# Patient Record
Sex: FEMALE | Race: WHITE | NOT HISPANIC OR LATINO | Employment: PART TIME | URBAN - METROPOLITAN AREA
[De-identification: names, ages, dates, MRNs, and addresses within clinical notes are randomized per-mention and may not be internally consistent; named-entity substitution may affect disease eponyms.]

---

## 2018-07-24 PROBLEM — N31.9 NEUROGENIC BLADDER: Chronic | Status: ACTIVE | Noted: 2018-07-24

## 2018-07-24 PROBLEM — M25.562 CHRONIC ARTHRALGIAS OF KNEES AND HIPS: Chronic | Status: ACTIVE | Noted: 2018-07-24

## 2018-07-24 PROBLEM — E03.4 HYPOTHYROIDISM DUE TO ACQUIRED ATROPHY OF THYROID: Chronic | Status: ACTIVE | Noted: 2018-07-24

## 2018-07-24 PROBLEM — G89.29 CHRONIC ARTHRALGIAS OF KNEES AND HIPS: Chronic | Status: ACTIVE | Noted: 2018-07-24

## 2018-07-24 PROBLEM — M48.061 SPINAL STENOSIS OF LUMBAR REGION WITHOUT NEUROGENIC CLAUDICATION: Chronic | Status: ACTIVE | Noted: 2018-07-24

## 2018-07-24 PROBLEM — G35 MS (MULTIPLE SCLEROSIS): Chronic | Status: ACTIVE | Noted: 2018-07-24

## 2018-07-24 PROBLEM — M25.552 CHRONIC ARTHRALGIAS OF KNEES AND HIPS: Chronic | Status: ACTIVE | Noted: 2018-07-24

## 2018-07-24 PROBLEM — E55.9 VITAMIN D DEFICIENCY: Chronic | Status: ACTIVE | Noted: 2018-07-24

## 2018-07-24 PROBLEM — M25.561 CHRONIC ARTHRALGIAS OF KNEES AND HIPS: Chronic | Status: ACTIVE | Noted: 2018-07-24

## 2018-07-24 PROBLEM — M25.551 CHRONIC ARTHRALGIAS OF KNEES AND HIPS: Chronic | Status: ACTIVE | Noted: 2018-07-24

## 2020-04-02 PROBLEM — K76.0 FATTY LIVER DISEASE, NONALCOHOLIC: Chronic | Status: ACTIVE | Noted: 2020-04-02

## 2020-04-02 PROBLEM — K80.20 CALCULUS OF GALLBLADDER WITHOUT CHOLECYSTITIS WITHOUT OBSTRUCTION: Chronic | Status: ACTIVE | Noted: 2020-04-02

## 2021-02-11 PROBLEM — R60.0 PEDAL EDEMA: Status: ACTIVE | Noted: 2021-02-11

## 2021-07-27 PROBLEM — E66.811 CLASS 1 OBESITY DUE TO EXCESS CALORIES WITH SERIOUS COMORBIDITY AND BODY MASS INDEX (BMI) OF 33.0 TO 33.9 IN ADULT: Status: ACTIVE | Noted: 2021-07-27

## 2021-07-27 PROBLEM — E66.09 CLASS 1 OBESITY DUE TO EXCESS CALORIES WITH SERIOUS COMORBIDITY AND BODY MASS INDEX (BMI) OF 33.0 TO 33.9 IN ADULT: Status: ACTIVE | Noted: 2021-07-27

## 2021-10-15 ENCOUNTER — IMMUNIZATION (OUTPATIENT)
Dept: PRIMARY CARE CLINIC | Facility: CLINIC | Age: 61
End: 2021-10-15

## 2021-10-15 DIAGNOSIS — Z23 NEED FOR VACCINATION: Primary | ICD-10-CM

## 2021-10-15 PROCEDURE — 91300 COVID-19, MRNA, LNP-S, PF, 30 MCG/0.3 ML DOSE VACCINE: CPT | Mod: PBBFAC | Performed by: INTERNAL MEDICINE

## 2021-10-15 PROCEDURE — 0003A COVID-19, MRNA, LNP-S, PF, 30 MCG/0.3 ML DOSE VACCINE: CPT | Mod: CV19,PBBFAC | Performed by: INTERNAL MEDICINE

## 2021-12-21 ENCOUNTER — PATIENT MESSAGE (OUTPATIENT)
Dept: NEUROLOGY | Facility: CLINIC | Age: 61
End: 2021-12-21

## 2022-02-28 ENCOUNTER — PATIENT MESSAGE (OUTPATIENT)
Dept: PSYCHIATRY | Facility: CLINIC | Age: 62
End: 2022-02-28

## 2022-06-24 ENCOUNTER — PATIENT MESSAGE (OUTPATIENT)
Dept: PSYCHIATRY | Facility: CLINIC | Age: 62
End: 2022-06-24

## 2022-10-06 PROBLEM — U07.1 COVID-19 VIRUS INFECTION: Status: ACTIVE | Noted: 2022-10-06

## 2022-12-01 ENCOUNTER — PATIENT MESSAGE (OUTPATIENT)
Dept: PSYCHIATRY | Facility: CLINIC | Age: 62
End: 2022-12-01

## 2023-02-20 ENCOUNTER — PATIENT MESSAGE (OUTPATIENT)
Dept: PSYCHIATRY | Facility: CLINIC | Age: 63
End: 2023-02-20

## 2023-07-21 ENCOUNTER — PATIENT MESSAGE (OUTPATIENT)
Dept: PSYCHIATRY | Facility: CLINIC | Age: 63
End: 2023-07-21

## 2024-01-22 ENCOUNTER — PATIENT MESSAGE (OUTPATIENT)
Dept: PSYCHIATRY | Facility: CLINIC | Age: 64
End: 2024-01-22

## 2024-05-02 ENCOUNTER — PATIENT MESSAGE (OUTPATIENT)
Dept: PSYCHIATRY | Facility: CLINIC | Age: 64
End: 2024-05-02

## 2024-07-25 ENCOUNTER — PATIENT MESSAGE (OUTPATIENT)
Dept: PSYCHIATRY | Facility: CLINIC | Age: 64
End: 2024-07-25

## 2024-08-05 ENCOUNTER — PATIENT MESSAGE (OUTPATIENT)
Dept: PSYCHIATRY | Facility: CLINIC | Age: 64
End: 2024-08-05

## 2024-12-16 ENCOUNTER — PATIENT MESSAGE (OUTPATIENT)
Dept: PSYCHIATRY | Facility: CLINIC | Age: 64
End: 2024-12-16

## 2025-04-27 ENCOUNTER — HOSPITAL ENCOUNTER (EMERGENCY)
Facility: HOSPITAL | Age: 65
Discharge: HOME | End: 2025-04-27
Attending: SURGERY
Payer: MEDICARE

## 2025-04-27 VITALS
OXYGEN SATURATION: 95 % | SYSTOLIC BLOOD PRESSURE: 146 MMHG | WEIGHT: 190.06 LBS | HEIGHT: 62 IN | DIASTOLIC BLOOD PRESSURE: 74 MMHG | HEART RATE: 88 BPM | BODY MASS INDEX: 34.98 KG/M2 | RESPIRATION RATE: 20 BRPM | TEMPERATURE: 99 F
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
## 2025-04-27 DIAGNOSIS — M25.511 SHOULDER PAIN, RIGHT: ICD-10-CM
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
## 2025-04-27 PROCEDURE — 99284 EMERGENCY DEPT VISIT MOD MDM: CPT | Mod: 25

## 2025-04-27 RX ORDER — TRAMADOL HYDROCHLORIDE AND ACETAMINOPHEN 37.5; 325 MG/1; MG/1
1 TABLET ORAL 3 TIMES DAILY PRN
Qty: 8 TABLET | Refills: 0 | Status: SHIPPED | OUTPATIENT
Start: 2025-04-27 | End: 2025-05-01

## 2025-04-27 NOTE — ED PROVIDER NOTES
Encounter Date: 2025       History     Chief Complaint   Patient presents with    Fall     Pt arrives to the ed with c/o right knee pain/abrasion after a trip and fall pta.pt reports right arm and shoulder pain as well     This note is dictated on M*Modal word recognition program.  There are word recognition mistakes and grammatical errors that are occasionally missed on review.     Lucero Philip is a 64 y.o. female with a history of seasonal allergies, fatty liver, multiple sclerosis presents to ER today with complaints of mechanical [place] patient reports she hurt her right shoulder, right knee, right wrist and right elbow when she fell.  Denies LOC denies head injury rates pain 8/10.  Declines pain medicine at this time. reports she will take a Tylenol to help with pain    The history is provided by the patient.     Review of patient's allergies indicates:   Allergen Reactions    Pcn [penicillins] Hives and Shortness Of Breath     Past Medical History:   Diagnosis Date    Abnormal gait     Allergy     SEASONAL    Ankle pain     Chronic arthralgias of knees and hips     Diverticulosis     Fatty liver     Hip pain     Hypothyroidism     Hypothyroidism due to acquired atrophy of thyroid     Knee pain     Leg pain     Low back pain     Lumbar stenosis     Migraine aura without headache (migraine equivalents)     MS (multiple sclerosis)     Neurogenic bladder     Paresthesias     Spinal stenosis of lumbar region without neurogenic claudication     Spondylolisthesis     Unspecified hemorrhoids     Vitamin D deficiency      Past Surgical History:   Procedure Laterality Date     SECTION      COLONOSCOPY N/A 2023    Procedure: COLONOSCOPY;  Surgeon: Delroy Marx MD;  Location: [facility];  Service: Endoscopy;  Laterality: N/A;    HYSTERECTOMY       Family History   Problem Relation Name Age of Onset    Arthritis Mother      COPD Mother      Diabetes Mother      Hearing loss  Mother      Heart disease Mother      Hypertension Mother      Kidney disease Mother      Alcohol abuse Father      Arthritis Father      Heart disease Father      Hypertension Father       Social History[1]  Review of Systems   Musculoskeletal:  Positive for arthralgias, joint swelling and myalgias.   All other systems reviewed and are negative.      Physical Exam     Initial Vitals [04/27/25 1457]   BP Pulse Resp Temp SpO2   (!) 146/74 88 20 98.7 °F (37.1 °C) 95 %      MAP       --         Physical Exam    Constitutional: She appears well-developed and well-nourished.   Eyes: Pupils are equal, round, and reactive to light.   Neck: Neck supple.   Normal range of motion.  Cardiovascular:  Normal rate, regular rhythm and normal heart sounds.           Pulmonary/Chest: Breath sounds normal. No respiratory distress.   Abdominal: Abdomen is soft.   Musculoskeletal:         General: Tenderness and edema present.      Cervical back: Normal range of motion and neck supple.      Comments: Patient has tenderness to anterior right shoulder with guarding, mild right elbow tenderness, mild wrist tenderness patient also has tenderness to right knee with a abrasion to right knee on examination     Neurological: She is alert and oriented to person, place, and time. GCS score is 15. GCS eye subscore is 4. GCS verbal subscore is 5. GCS motor subscore is 6.   Skin: Capillary refill takes less than 2 seconds. No rash and no abscess noted. No erythema. No pallor.   Psychiatric: She has a normal mood and affect. Her behavior is normal. Thought content normal.         ED Course   Procedures  Labs Reviewed - No data to display       Imaging Results              X-Ray Shoulder Complete 2 View Right (Final result)  Result time 04/27/25 15:36:08      Final result by Annie Andrade MD (04/27/25 15:36:08)                   Impression:      There is no evidence acute bony injury of the right shoulder.      Electronically signed by: Annie  MD Lupe  Date:    04/27/2025  Time:    15:36               Narrative:    EXAMINATION:  XR SHOULDER COMPLETE 2 OR MORE VIEWS RIGHT    CLINICAL HISTORY:  Pain in right shoulder    TECHNIQUE:  Two or three views of the right shoulder were performed.    COMPARISON:  None    FINDINGS:  There is no evidence fracture or malalignment.  The adjacent soft tissues are unremarkable.                                       X-Ray Wrist Complete Right (Final result)  Result time 04/27/25 15:38:09      Final result by Annie Andrade MD (04/27/25 15:38:09)                   Impression:      There is no evidence acute right wrist injury.      Electronically signed by: Annie Andrade MD  Date:    04/27/2025  Time:    15:38               Narrative:    EXAMINATION:  XR WRIST COMPLETE 3 VIEWS RIGHT    CLINICAL HISTORY:  Unspecified fall, initial encounter    TECHNIQUE:  PA, lateral, and oblique views of the right wrist were performed.    COMPARISON:  None    FINDINGS:  There is a prior ulnar styloid fracture.  There is no evidence acute fracture or dislocation.  The adjacent soft tissues are unremarkable.                                       X-Ray Elbow Complete Right (Final result)  Result time 04/27/25 15:39:08      Final result by Annie Andrade MD (04/27/25 15:39:08)                   Impression:      There is no evidence acute bony injury of the right elbow.      Electronically signed by: Annie Andrade MD  Date:    04/27/2025  Time:    15:39               Narrative:    EXAMINATION:  XR ELBOW COMPLETE 3 VIEW RIGHT    CLINICAL HISTORY:  . Unspecified fall, initial encounter    TECHNIQUE:  AP, lateral, and oblique views of the right elbow were performed.    COMPARISON:  None    FINDINGS:  There is no evidence fracture or dislocation.  The adjacent soft tissues are unremarkable.                                       X-Ray Knee 1 or 2 View Right (Final result)  Result time 04/27/25 15:40:55   Procedure changed from  X-Ray Knee 3 View Right     Final result by Annie Andrade MD (04/27/25 15:40:55)                   Impression:      There are severe osteoarthritic changes of the knee most pronounced in the medial compartment.      Electronically signed by: Annie Andrade MD  Date:    04/27/2025  Time:    15:40               Narrative:    EXAMINATION:  XR KNEE 1 OR 2 VIEW RIGHT    CLINICAL HISTORY:  wrong order;  Unspecified fall, initial encounter    TECHNIQUE:  AP and lateral views of the right knee were performed.    COMPARISON:  None    FINDINGS:  There is joint space narrowing with adjacent sclerosis involving the medial compartment.  There is adjacent osteophyte formation involving all 3 compartments.  The adjacent soft tissues are unremarkable.                                       Medications - No data to display  Medical Decision Making  Differential diagnosis include knee fracture, knee dislocation, shoulder fracture, shoulder dislocation, wrist fracture wrist sprain, elbow fracture, elbow sprain, mechanical fall    X-ray right shoulder reveals no fracture or dislocation of right shoulder  X-ray of right wrist reveals no acute fracture dislocation of right wrist  X-ray of right elbow reveals no acute fracture of right elbow  X-ray of right knee reveals following impression per Radiology--  There are severe osteoarthritic changes of the knee most pronounced in the medial compartment.  Patient has no acute fractures on x-rays performed today   Right arm placed in sling by nursing staff   Right upper and lower extremity neurovascular intact at time of discharge  Will treat patient's pain with Ultracet as needed   Ambulatory referral to [facility] placed within [facility]  Patient stable at time of discharge in no acute distress.  No life-threatening illnesses were found during ER visit today.  Patient was instructed to follow-up with PCP or other recommended specialist within the next 48-72  hours.  Patient was instructed to return to ER immediately for any worsening or concerning symptoms.  All discharge instructions discussed with patient, and patient agrees to comply with discharge instructions given today.           Amount and/or Complexity of Data Reviewed  Radiology: ordered.    Risk  Prescription drug management.                                      Clinical Impression:  Final diagnoses:  [M25.511] Shoulder pain, right  [W19.XXXA] Fall  [W19.XXXA] Fall, initial encounter (Primary)          ED Disposition Condition    Discharge Stable          ED Prescriptions       Medication Sig Dispense Start Date End Date Auth. Provider    tramadol-acetaminophen 37.5-325 mg (ULTRACET) 37.5-325 mg Tab Take 1 tablet by mouth 3 (three) times daily as needed for Pain. 8 tablet 4/27/2025 5/1/2025 Bjorn Grimaldo, NP          Follow-up Information    None              [1]   Social History  Tobacco Use    Smoking status: Never    Smokeless tobacco: Never   Substance Use Topics    Alcohol use: Yes     Comment: Occ    Drug use: No        Bjorn Grimaldo, NP  04/27/25 0640

## 2025-04-27 NOTE — Clinical Note
"Lucero Philip (Michelle) was seen and treated in our emergency department on 4/27/2025.  She may return to work on 04/28/2025.       If you have any questions or concerns, please don't hesitate to call.       RN    "